# Patient Record
Sex: MALE | Race: BLACK OR AFRICAN AMERICAN | NOT HISPANIC OR LATINO | Employment: FULL TIME | URBAN - METROPOLITAN AREA
[De-identification: names, ages, dates, MRNs, and addresses within clinical notes are randomized per-mention and may not be internally consistent; named-entity substitution may affect disease eponyms.]

---

## 2018-12-19 ENCOUNTER — HOSPITAL ENCOUNTER (EMERGENCY)
Facility: HOSPITAL | Age: 48
Discharge: HOME | End: 2018-12-19
Attending: EMERGENCY MEDICINE | Admitting: EMERGENCY MEDICINE

## 2018-12-19 ENCOUNTER — APPOINTMENT (OUTPATIENT)
Dept: CT IMAGING | Facility: HOSPITAL | Age: 48
End: 2018-12-19

## 2018-12-19 ENCOUNTER — APPOINTMENT (OUTPATIENT)
Dept: GENERAL RADIOLOGY | Facility: HOSPITAL | Age: 48
End: 2018-12-19

## 2018-12-19 VITALS
TEMPERATURE: 98.8 F | BODY MASS INDEX: 39.99 KG/M2 | RESPIRATION RATE: 18 BRPM | OXYGEN SATURATION: 96 % | WEIGHT: 270 LBS | DIASTOLIC BLOOD PRESSURE: 83 MMHG | SYSTOLIC BLOOD PRESSURE: 126 MMHG | HEIGHT: 69 IN | HEART RATE: 79 BPM

## 2018-12-19 DIAGNOSIS — R10.84 ABDOMINAL DISCOMFORT, GENERALIZED: ICD-10-CM

## 2018-12-19 DIAGNOSIS — R07.89 ATYPICAL CHEST PAIN: Primary | ICD-10-CM

## 2018-12-19 DIAGNOSIS — E66.9 OBESITY (BMI 30-39.9): ICD-10-CM

## 2018-12-19 DIAGNOSIS — I10 ELEVATED BLOOD PRESSURE READING WITH DIAGNOSIS OF HYPERTENSION: ICD-10-CM

## 2018-12-19 DIAGNOSIS — R51.9 ACUTE NONINTRACTABLE HEADACHE, UNSPECIFIED HEADACHE TYPE: ICD-10-CM

## 2018-12-19 LAB
ALBUMIN SERPL-MCNC: 4.52 G/DL (ref 3.2–4.8)
ALBUMIN/GLOB SERPL: 1.6 G/DL (ref 1.5–2.5)
ALP SERPL-CCNC: 119 U/L (ref 25–100)
ALT SERPL W P-5'-P-CCNC: 27 U/L (ref 7–40)
AMYLASE SERPL-CCNC: 116 U/L (ref 30–118)
ANION GAP SERPL CALCULATED.3IONS-SCNC: 9 MMOL/L (ref 3–11)
AST SERPL-CCNC: 25 U/L (ref 0–33)
BASOPHILS # BLD AUTO: 0.01 10*3/MM3 (ref 0–0.2)
BASOPHILS NFR BLD AUTO: 0.1 % (ref 0–1)
BILIRUB SERPL-MCNC: 0.8 MG/DL (ref 0.3–1.2)
BNP SERPL-MCNC: 3 PG/ML (ref 0–100)
BUN BLD-MCNC: 11 MG/DL (ref 9–23)
BUN/CREAT SERPL: 10.9 (ref 7–25)
CALCIUM SPEC-SCNC: 9 MG/DL (ref 8.7–10.4)
CHLORIDE SERPL-SCNC: 105 MMOL/L (ref 99–109)
CO2 SERPL-SCNC: 27 MMOL/L (ref 20–31)
CREAT BLD-MCNC: 1.01 MG/DL (ref 0.6–1.3)
D DIMER PPP FEU-MCNC: 0.42 MG/L (FEU) (ref 0–0.5)
DEPRECATED RDW RBC AUTO: 44.7 FL (ref 37–54)
EOSINOPHIL # BLD AUTO: 0.11 10*3/MM3 (ref 0–0.3)
EOSINOPHIL NFR BLD AUTO: 1.3 % (ref 0–3)
ERYTHROCYTE [DISTWIDTH] IN BLOOD BY AUTOMATED COUNT: 12.9 % (ref 11.3–14.5)
GFR SERPL CREATININE-BSD FRML MDRD: 95 ML/MIN/1.73
GLOBULIN UR ELPH-MCNC: 2.9 GM/DL
GLUCOSE BLD-MCNC: 105 MG/DL (ref 70–100)
HCT VFR BLD AUTO: 39.5 % (ref 38.9–50.9)
HGB BLD-MCNC: 13.5 G/DL (ref 13.1–17.5)
HOLD SPECIMEN: NORMAL
HOLD SPECIMEN: NORMAL
IMM GRANULOCYTES # BLD: 0.01 10*3/MM3 (ref 0–0.03)
IMM GRANULOCYTES NFR BLD: 0.1 % (ref 0–0.6)
LIPASE SERPL-CCNC: 54 U/L (ref 6–51)
LYMPHOCYTES # BLD AUTO: 4 10*3/MM3 (ref 0.6–4.8)
LYMPHOCYTES NFR BLD AUTO: 47.8 % (ref 24–44)
MCH RBC QN AUTO: 32.3 PG (ref 27–31)
MCHC RBC AUTO-ENTMCNC: 34.2 G/DL (ref 32–36)
MCV RBC AUTO: 94.5 FL (ref 80–99)
MONOCYTES # BLD AUTO: 0.53 10*3/MM3 (ref 0–1)
MONOCYTES NFR BLD AUTO: 6.3 % (ref 0–12)
NEUTROPHILS # BLD AUTO: 3.7 10*3/MM3 (ref 1.5–8.3)
NEUTROPHILS NFR BLD AUTO: 44.4 % (ref 41–71)
PLATELET # BLD AUTO: 278 10*3/MM3 (ref 150–450)
PMV BLD AUTO: 9.1 FL (ref 6–12)
POTASSIUM BLD-SCNC: 3.3 MMOL/L (ref 3.5–5.5)
PROT SERPL-MCNC: 7.4 G/DL (ref 5.7–8.2)
RBC # BLD AUTO: 4.18 10*6/MM3 (ref 4.2–5.76)
SODIUM BLD-SCNC: 141 MMOL/L (ref 132–146)
TROPONIN I SERPL-MCNC: 0 NG/ML (ref 0–0.07)
TROPONIN I SERPL-MCNC: 0.01 NG/ML (ref 0–0.07)
WBC NRBC COR # BLD: 8.36 10*3/MM3 (ref 3.5–10.8)
WHOLE BLOOD HOLD SPECIMEN: NORMAL
WHOLE BLOOD HOLD SPECIMEN: NORMAL

## 2018-12-19 PROCEDURE — 85025 COMPLETE CBC W/AUTO DIFF WBC: CPT | Performed by: EMERGENCY MEDICINE

## 2018-12-19 PROCEDURE — 80053 COMPREHEN METABOLIC PANEL: CPT | Performed by: EMERGENCY MEDICINE

## 2018-12-19 PROCEDURE — 85379 FIBRIN DEGRADATION QUANT: CPT | Performed by: EMERGENCY MEDICINE

## 2018-12-19 PROCEDURE — 25010000002 ONDANSETRON PER 1 MG: Performed by: EMERGENCY MEDICINE

## 2018-12-19 PROCEDURE — 96374 THER/PROPH/DIAG INJ IV PUSH: CPT

## 2018-12-19 PROCEDURE — 96375 TX/PRO/DX INJ NEW DRUG ADDON: CPT

## 2018-12-19 PROCEDURE — 83880 ASSAY OF NATRIURETIC PEPTIDE: CPT | Performed by: EMERGENCY MEDICINE

## 2018-12-19 PROCEDURE — 71045 X-RAY EXAM CHEST 1 VIEW: CPT

## 2018-12-19 PROCEDURE — 25010000002 KETOROLAC TROMETHAMINE PER 15 MG: Performed by: EMERGENCY MEDICINE

## 2018-12-19 PROCEDURE — 82150 ASSAY OF AMYLASE: CPT | Performed by: EMERGENCY MEDICINE

## 2018-12-19 PROCEDURE — 93005 ELECTROCARDIOGRAM TRACING: CPT | Performed by: EMERGENCY MEDICINE

## 2018-12-19 PROCEDURE — 99285 EMERGENCY DEPT VISIT HI MDM: CPT

## 2018-12-19 PROCEDURE — 74177 CT ABD & PELVIS W/CONTRAST: CPT

## 2018-12-19 PROCEDURE — 0 IOPAMIDOL PER 1 ML: Performed by: EMERGENCY MEDICINE

## 2018-12-19 PROCEDURE — 84484 ASSAY OF TROPONIN QUANT: CPT

## 2018-12-19 PROCEDURE — 83690 ASSAY OF LIPASE: CPT | Performed by: EMERGENCY MEDICINE

## 2018-12-19 RX ORDER — SODIUM CHLORIDE 0.9 % (FLUSH) 0.9 %
10 SYRINGE (ML) INJECTION AS NEEDED
Status: DISCONTINUED | OUTPATIENT
Start: 2018-12-19 | End: 2018-12-19 | Stop reason: HOSPADM

## 2018-12-19 RX ORDER — ASPIRIN 81 MG/1
324 TABLET, CHEWABLE ORAL ONCE
Status: COMPLETED | OUTPATIENT
Start: 2018-12-19 | End: 2018-12-19

## 2018-12-19 RX ORDER — FAMOTIDINE 10 MG/ML
20 INJECTION, SOLUTION INTRAVENOUS ONCE
Status: COMPLETED | OUTPATIENT
Start: 2018-12-19 | End: 2018-12-19

## 2018-12-19 RX ORDER — ACETAMINOPHEN 500 MG
1000 TABLET ORAL ONCE
Status: COMPLETED | OUTPATIENT
Start: 2018-12-19 | End: 2018-12-19

## 2018-12-19 RX ORDER — PRAVASTATIN SODIUM 40 MG
40 TABLET ORAL DAILY
COMMUNITY

## 2018-12-19 RX ORDER — LISINOPRIL 10 MG/1
10 TABLET ORAL DAILY
COMMUNITY

## 2018-12-19 RX ORDER — ALUMINA, MAGNESIA, AND SIMETHICONE 2400; 2400; 240 MG/30ML; MG/30ML; MG/30ML
15 SUSPENSION ORAL ONCE
Status: COMPLETED | OUTPATIENT
Start: 2018-12-19 | End: 2018-12-19

## 2018-12-19 RX ORDER — RANITIDINE 150 MG/1
150 TABLET ORAL 2 TIMES DAILY
Qty: 28 TABLET | Refills: 0 | Status: SHIPPED | OUTPATIENT
Start: 2018-12-19

## 2018-12-19 RX ORDER — LISINOPRIL AND HYDROCHLOROTHIAZIDE 12.5; 1 MG/1; MG/1
1 TABLET ORAL DAILY
COMMUNITY

## 2018-12-19 RX ORDER — ONDANSETRON 2 MG/ML
4 INJECTION INTRAMUSCULAR; INTRAVENOUS ONCE
Status: COMPLETED | OUTPATIENT
Start: 2018-12-19 | End: 2018-12-19

## 2018-12-19 RX ORDER — MULTIVIT AND MINERALS-FERROUS GLUCONATE 9 MG IRON/15 ML ORAL LIQUID 9 MG/15 ML
LIQUID (ML) ORAL DAILY
COMMUNITY

## 2018-12-19 RX ORDER — KETOROLAC TROMETHAMINE 15 MG/ML
10 INJECTION, SOLUTION INTRAMUSCULAR; INTRAVENOUS ONCE
Status: COMPLETED | OUTPATIENT
Start: 2018-12-19 | End: 2018-12-19

## 2018-12-19 RX ADMIN — SODIUM CHLORIDE 1000 ML: 9 INJECTION, SOLUTION INTRAVENOUS at 10:20

## 2018-12-19 RX ADMIN — LIDOCAINE HYDROCHLORIDE 15 ML: 20 SOLUTION ORAL; TOPICAL at 07:27

## 2018-12-19 RX ADMIN — NITROGLYCERIN 1 INCH: 20 OINTMENT TOPICAL at 07:26

## 2018-12-19 RX ADMIN — KETOROLAC TROMETHAMINE 10 MG: 15 INJECTION INTRAMUSCULAR; INTRAVENOUS at 08:25

## 2018-12-19 RX ADMIN — ACETAMINOPHEN 1000 MG: 500 TABLET, FILM COATED ORAL at 08:24

## 2018-12-19 RX ADMIN — ONDANSETRON 4 MG: 2 INJECTION INTRAMUSCULAR; INTRAVENOUS at 10:19

## 2018-12-19 RX ADMIN — ALUMINUM HYDROXIDE, MAGNESIUM HYDROXIDE, AND DIMETHICONE 15 ML: 400; 400; 40 SUSPENSION ORAL at 07:27

## 2018-12-19 RX ADMIN — ASPIRIN 81 MG 324 MG: 81 TABLET ORAL at 07:05

## 2018-12-19 RX ADMIN — IOPAMIDOL 95 ML: 755 INJECTION, SOLUTION INTRAVENOUS at 10:35

## 2018-12-19 RX ADMIN — FAMOTIDINE 20 MG: 10 INJECTION, SOLUTION INTRAVENOUS at 07:25

## 2018-12-19 NOTE — ED PROVIDER NOTES
Subjective   The patient presents to the emergency department with substernal burning chest pain that started about 30 minutes prior to arrival as the patient was getting out of bed this morning.  Patient reports the pain is a steadily increasing burning with associated shortness of breath and nausea.  Patient denies any cardiac history and denies any recent decrease in activity tolerance.  Patient does report he returned yesterday from a trip to [place].  The patient has a prior history of hypertension.  Family history of diabetes and hypertension.  No known cardiac history in first-degree relatives.  Patient states nothing of this point makes it better or worse.  Patient denies any leg pain or swelling.  No fever or chills.  No cough.  No vomiting or diarrhea.    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  The patient and wife reports that that was completely negative.        History provided by:  Patient      Review of Systems   Constitutional: Negative.    Respiratory: Positive for shortness of breath.    Cardiovascular: Positive for chest pain.   Gastrointestinal: Positive for nausea. Negative for diarrhea and vomiting.   Genitourinary: Negative.    Musculoskeletal: Negative.    Skin: Negative.    Allergic/Immunologic: Negative for immunocompromised state.   Neurological: Negative.    Hematological: Negative.    Psychiatric/Behavioral: Negative.    All other systems reviewed and are negative.      Past Medical History:   Diagnosis Date   • Hyperlipidemia    • Hypertension        No Known Allergies    Past Surgical History:   Procedure Laterality Date   • CARDIAC CATHETERIZATION     • KNEE SURGERY     • SHOULDER SURGERY         History reviewed. No pertinent family history.    Social History     Socioeconomic History   • Marital status:      Spouse name: Not on file   • Number of children: Not on file    • Years of education: Not on file   • Highest education level: Not on file   Tobacco Use   • Smoking status: Never Smoker   • Smokeless tobacco: Never Used   Substance and Sexual Activity   • Alcohol use: Yes     Comment: 2X/ WEEK    • Drug use: No   • Sexual activity: Defer           Objective   Physical Exam   Constitutional: He is oriented to person, place, and time. He appears well-developed and well-nourished. He does not appear ill. No distress.   HENT:   Head: Normocephalic and atraumatic.   Neck: Normal range of motion. Neck supple.   Cardiovascular: Normal rate, regular rhythm, intact distal pulses and normal pulses.   Pulmonary/Chest: Effort normal and breath sounds normal.   Abdominal: Soft. There is no tenderness. There is no guarding.   Musculoskeletal: Normal range of motion.        Right lower leg: Normal. He exhibits no tenderness and no edema.        Left lower leg: Normal. He exhibits no tenderness and no edema.   Neurological: He is alert and oriented to person, place, and time.   Skin: Skin is warm and dry. Capillary refill takes less than 2 seconds.   Psychiatric: He has a normal mood and affect.   Nursing note and vitals reviewed.      Procedures           ED Course  ED Course as of Dec 20 0655   Wed Dec 19, 2018   0716 Troponin I: 0.00 [RS]   0800 D-dimer, Quant: 0.42 [RS]   0800 BNP: 3.0 [RS]   0849 Amylase: 116 [RS]   1012 I reevaluated the patient.  He has 2 sets of negative cardiac enzymes.  However, he now complains of abdominal fullness and pain with nausea.  Abdomen is nonsurgical he states the palpation doesn't exacerbate the pain.  He also reports a headache even though the Nitropaste has been removed.  I feel more now that this could potentially represent an infectious etiology.  We will obtain imaging of the abdomen to ensure no surgical findings.  He voices understanding and is agreeable.  [RS]   1110 CT scan demonstrates no emergent findings.  No significant acute surgical or  emergent abnormalities on her evaluation.  A she relatively low risk for acute coronary syndrome secondary to negative heart catheterization 10 years ago with no significant family history an atypical presentation.  Patient also has normal EKG while having the chest pain.  This is highly suggestive against a cardiac etiology.  However, we will refer the patient chest pain clinic for stress test but we'll treat symptomatically otherwise.  Patient voices understanding and agrees.  [RS]   1111 Reevaluated the patient and discussed the findings and plan for further care.   [TJ]   1201 I was able to review the records that were found from [facility].  Patient had a heart catheterization in October 2007.  There was minimal less than 10% disease of the circumflex, otherwise the catheterization was clear of any significant disease.  There is no other significant findings.  Review of these records does not change our disposition plan and supports the likelihood most likely not cardiac in etiology.  [RS]      ED Course User Index  [RS] Al Lenz MD  [TJ] Jose David Abdullahi                HEART Score (for prediction of 6-week risk of major adverse cardiac event) reviewed and/or performed as part of the patient evaluation and treatment planning process.  The result associated with this review/performance is: 2       MDM  Number of Diagnoses or Management Options  Abdominal discomfort, generalized:   Acute nonintractable headache, unspecified headache type:   Atypical chest pain:   Elevated blood pressure reading with diagnosis of hypertension:   Obesity (BMI 30-39.9):   Diagnosis management comments: Recent Results (from the past 24 hour(s))  -Comprehensive Metabolic Panel  Collection Time: 12/19/18  6:56 AM       Result                      Value             Ref Range           Glucose                     105 (H)           70 - 100 mg/*       BUN                         11                9 - 23 mg/dL         Creatinine                  1.01              0.60 - 1.30 *       Sodium                      141               132 - 146 mm*       Potassium                   3.3 (L)           3.5 - 5.5 mm*       Chloride                    105               99 - 109 mmo*       CO2                         27.0              20.0 - 31.0 *       Calcium                     9.0               8.7 - 10.4 m*       Total Protein               7.4               5.7 - 8.2 g/*       Albumin                     4.52              3.20 - 4.80 *       ALT (SGPT)                  27                7 - 40 U/L          AST (SGOT)                  25                0 - 33 U/L          Alkaline Phosphatase        119 (H)           25 - 100 U/L        Total Bilirubin             0.8               0.3 - 1.2 mg*       eGFR   Amer          95                >60 mL/min/1*       Globulin                    2.9               gm/dL               A/G Ratio                   1.6               1.5 - 2.5 g/*       BUN/Creatinine Ratio        10.9              7.0 - 25.0          Anion Gap                   9.0               3.0 - 11.0 m*  -Lipase  Collection Time: 12/19/18  6:56 AM       Result                      Value             Ref Range           Lipase                      54 (H)            6 - 51 U/L     -BNP  Collection Time: 12/19/18  6:56 AM       Result                      Value             Ref Range           BNP                         3.0               0.0 - 100.0 *  -Light Blue Top  Collection Time: 12/19/18  6:56 AM       Result                      Value             Ref Range           Extra Tube                                                    hold for add-on  -Green Top (Gel)  Collection Time: 12/19/18  6:56 AM       Result                      Value             Ref Range           Extra Tube                                                    Hold for add-ons.  -Lavender Top  Collection Time: 12/19/18  6:56 AM       Result                       Value             Ref Range           Extra Tube                                                    hold for add-on  -Gold Top - SST  Collection Time: 12/19/18  6:56 AM       Result                      Value             Ref Range           Extra Tube                                                    Hold for add-ons.  -CBC Auto Differential  Collection Time: 12/19/18  6:56 AM       Result                      Value             Ref Range           WBC                         8.36              3.50 - 10.80*       RBC                         4.18 (L)          4.20 - 5.76 *       Hemoglobin                  13.5              13.1 - 17.5 *       Hematocrit                  39.5              38.9 - 50.9 %       MCV                         94.5              80.0 - 99.0 *       MCH                         32.3 (H)          27.0 - 31.0 *       MCHC                        34.2              32.0 - 36.0 *       RDW                         12.9              11.3 - 14.5 %       RDW-SD                      44.7              37.0 - 54.0 *       MPV                         9.1               6.0 - 12.0 fL       Platelets                   278               150 - 450 10*       Neutrophil %                44.4              41.0 - 71.0 %       Lymphocyte %                47.8 (H)          24.0 - 44.0 %       Monocyte %                  6.3               0.0 - 12.0 %        Eosinophil %                1.3               0.0 - 3.0 %         Basophil %                  0.1               0.0 - 1.0 %         Immature Grans %            0.1               0.0 - 0.6 %         Neutrophils, Absolute       3.70              1.50 - 8.30 *       Lymphocytes, Absolute       4.00              0.60 - 4.80 *       Monocytes, Absolute         0.53              0.00 - 1.00 *       Eosinophils, Absolute       0.11              0.00 - 0.30 *       Basophils, Absolute         0.01              0.00 - 0.20 *       Immature Grans, Absolu*     0.01               0.00 - 0.03 *  -D-dimer, Quantitative  Collection Time: 12/19/18  6:56 AM       Result                      Value             Ref Range           D-Dimer, Quantitative       0.42              0.00 - 0.50 *  -Amylase  Collection Time: 12/19/18  6:56 AM       Result                      Value             Ref Range           Amylase                     116               30 - 118 U/L   -POC Troponin, Rapid  Collection Time: 12/19/18  6:59 AM       Result                      Value             Ref Range           Troponin I                  0.00              0.00 - 0.07 *  -POC Troponin, Rapid  Collection Time: 12/19/18  8:59 AM       Result                      Value             Ref Range           Troponin I                  0.01              0.00 - 0.07 *  Note: In addition to lab results from this visit, the labs listed above may include labs taken at another facility or during a different encounter within the last 24 hours. Please correlate lab times with ED admission and discharge times for further clarification of the services performed during this visit.    CT Abdomen Pelvis With Contrast   Final Result    No acute finding in the abdomen or pelvis.         D:  12/19/2018    E:  12/19/2018         This report was finalized on 12/19/2018 2:11 PM by Duy Silva.          XR Chest 1 View   Final Result    Minimal bibasilar atelectasis however no focal consolidation    or effusion.         D:  12/19/2018    E:  12/19/2018         This report was finalized on 12/19/2018 10:58 AM by Dr. Rashaad Zhou.          -----------------------------------------------------            12/19/18 12/19/18 12/19/18 12/19/18               0930      0944      1106      1130     -----------------------------------------------------   BP:       125/81              127/75    126/83     BP Location:                     Left arm              Patient Position:                     Sitting                Pulse:                72        86        79       Resp:                           18 18       Temp:                                              TempSrc:                                           SpO2:                98%       96%       96%       Weight:                                            Height:                                           -----------------------------------------------------  Medications  aspirin chewable tablet 324 mg (324 mg Oral Given 12/19/18 0705)  famotidine (PEPCID) injection 20 mg (20 mg Intravenous Given 12/19/18 0725)  aluminum-magnesium hydroxide-simethicone (MAALOX MAX) 400-400-40 MG/5ML suspension 15 mL (15 mL Oral Given 12/19/18 0727)  lidocaine viscous (XYLOCAINE) 2 % mouth solution 15 mL (15 mL Mouth/Throat Given 12/19/18 0727)  nitroglycerin (NITROSTAT) ointment 1 inch (1 inch Topical Given 12/19/18 0726)  ketorolac (TORADOL) injection 10 mg (10 mg Intravenous Given 12/19/18 0825)  acetaminophen (TYLENOL) tablet 1,000 mg (1,000 mg Oral Given 12/19/18 0824)  sodium chloride 0.9 % bolus 1,000 mL (0 mL Intravenous Stopped 12/19/18 1152)  ondansetron (ZOFRAN) injection 4 mg (4 mg Intravenous Given 12/19/18 1019)  iopamidol (ISOVUE-370) 76 % injection 100 mL (95 mL Intravenous Given 12/19/18 1035)  ECG/EMG Results (last 24 hours)     Procedure Component Value Units Date/Time    ECG 12 Lead ([id number]) Collected:  12/19/18 0642     Updated:  12/19/18 1015    Narrative:       Test Reason : cp  Blood Pressure : **/** mmHG  Vent. Rate : 071 BPM     Atrial Rate : 071 BPM     P-R Int : 180 ms          QRS Dur : 094 ms      QT Int : 408 ms       P-R-T Axes : 032 -09 -01 degrees     QTc Int : 443 ms    Normal sinus rhythm  No previous ECGs available  Confirmed by MIRANDA NIÑO MD ([id number]) on 12/19/2018 10:14:54 AM    Referred By:  EDMD           Confirmed By:MIRANDA NIÑO MD    ECG 12 Lead ([id number]) Collected:  12/19/18 0907      Updated:  12/19/18 1015    Narrative:       Test Reason : second set  Blood Pressure : **/** mmHG  Vent. Rate : 077 BPM     Atrial Rate : 077 BPM     P-R Int : 182 ms          QRS Dur : 094 ms      QT Int : 400 ms       P-R-T Axes : 048 -05 -13 degrees     QTc Int : 452 ms    Normal sinus rhythm  Voltage criteria for left ventricular hypertrophy  Inferior infarct , age undetermined  Abnormal ECG  When compared with ECG of 19-DEC-2018 06:42, (Unconfirmed)  No significant change was found  Confirmed by AL LENZ MD ([id number]) on 12/19/2018 10:15:14 AM    Referred By:  SALINAS           Confirmed By:AL LENZ MD           Amount and/or Complexity of Data Reviewed  Clinical lab tests: reviewed  Tests in the radiology section of CPT®: reviewed  Decide to obtain previous medical records or to obtain history from someone other than the patient: yes  Review and summarize past medical records: yes  Independent visualization of images, tracings, or specimens: yes          Final diagnoses:   Atypical chest pain   Elevated blood pressure reading with diagnosis of hypertension   Abdominal discomfort, generalized   Acute nonintractable headache, unspecified headache type   Obesity (BMI 30-39.9)            Al Lenz MD  12/19/18 0716       Al Lenz MD  12/20/18 0655

## 2018-12-19 NOTE — DISCHARGE INSTRUCTIONS
Examination and testing today did not reveal a specific cause of your chest pain.  It is important to understand that this does not mean ?nothing is wrong? but rather that there is currently no evidence to support a specific diagnosis.  Some diseases, like heart disease, can be very serious but develop slowly over time and often the testing available in the ED will be ?normal? or ?nondiagnostic? even when significant blockage in the arteries is present.  This is why it is very important for you to have close followup for further testing, for example a stress test.  You are being referred to our chest pain followup clinic to help facilitate and expedite this further testing.  Please do not put off or delay further evaluation as the consequences may be severe.  If at any time you have worsening symptoms or develop any change in your condition that concerns you, please return to the ED for immediate evaluation. No strenuous activity until cleared by cardiology.

## 2018-12-19 NOTE — ED NOTES
[facility] WILL LOOK FOR CATH REPORT BUT SAYS THEIR RECORDS ONLY GO 10 YEARS BACK     Jaimie Patel  12/19/18 7660